# Patient Record
(demographics unavailable — no encounter records)

---

## 2025-04-09 NOTE — PHYSICAL EXAM
[Appropriately responsive] : appropriately responsive [Alert] : alert [No Acute Distress] : no acute distress [Soft] : soft [Non-tender] : non-tender [Non-distended] : non-distended [Oriented x3] : oriented x3 [Labia Majora] : normal [Labia Minora] : normal [Discharge] : a  ~M vaginal discharge was present [Heavy] : heavy [Vince] : yellow [Curds] : curd-like [No Bleeding] : There was no active vaginal bleeding [Normal] : normal

## 2025-04-09 NOTE — HISTORY OF PRESENT ILLNESS
[FreeTextEntry1] : Patient is a 41 year old, P1, presenting for c/o whitish discharge which correlates with cycle.  When her menses is impending discharge returns and lasts through her cycle and around a week afterwards   LMP: 3/25/24   GYNHx: Hx of abnl pap smears Denies fibroids/cysts Hx of endometriosis per chart Denies STIs   SexualHx: most recent encounter was 2 days ago   Contraception: none, with    Occupation: Marketing

## 2025-04-09 NOTE — PLAN
[FreeTextEntry1] : #Vaginal Irritation/Discharge - Affirm obtained and sent - STI testing offered/declined - Discussed benefits of probiotics and Boric Acid suppositories - Prescription for treatment of yeast sent to confirmed pharmacy - Will f/u once labs are resulted